# Patient Record
Sex: MALE | Employment: FULL TIME | URBAN - METROPOLITAN AREA
[De-identification: names, ages, dates, MRNs, and addresses within clinical notes are randomized per-mention and may not be internally consistent; named-entity substitution may affect disease eponyms.]

---

## 2018-04-19 ENCOUNTER — OFFICE VISIT (OUTPATIENT)
Dept: SLEEP MEDICINE | Facility: CLINIC | Age: 28
End: 2018-04-19
Payer: COMMERCIAL

## 2018-04-19 VITALS
DIASTOLIC BLOOD PRESSURE: 80 MMHG | HEIGHT: 74 IN | HEART RATE: 62 BPM | WEIGHT: 191.4 LBS | BODY MASS INDEX: 24.56 KG/M2 | OXYGEN SATURATION: 100 % | SYSTOLIC BLOOD PRESSURE: 147 MMHG | RESPIRATION RATE: 16 BRPM

## 2018-04-19 DIAGNOSIS — G25.81 RESTLESS LEGS SYNDROME (RLS): ICD-10-CM

## 2018-04-19 DIAGNOSIS — G47.50 PARASOMNIA: Primary | ICD-10-CM

## 2018-04-19 DIAGNOSIS — R03.0 ELEVATED BLOOD PRESSURE READING WITHOUT DIAGNOSIS OF HYPERTENSION: ICD-10-CM

## 2018-04-19 PROCEDURE — 99204 OFFICE O/P NEW MOD 45 MIN: CPT | Performed by: PHYSICIAN ASSISTANT

## 2018-04-19 NOTE — NURSING NOTE
"Chief Complaint   Patient presents with     Sleep Problem       Initial /79  Pulse 62  Resp 16  Ht 1.88 m (6' 2\")  Wt 86.8 kg (191 lb 6.4 oz)  SpO2 100%  BMI 24.57 kg/m2 Estimated body mass index is 24.57 kg/(m^2) as calculated from the following:    Height as of this encounter: 1.88 m (6' 2\").    Weight as of this encounter: 86.8 kg (191 lb 6.4 oz).  Medication Reconciliation: complete     ESS 5  Neck 39cm  Gris Cano    "

## 2018-04-19 NOTE — MR AVS SNAPSHOT
After Visit Summary   4/19/2018    Andrés Theodore    MRN: [id number]           Patient Information     Date Of Birth          1990        Visit Information        Provider Department      4/19/2018 10:30 AM Goltz, Bennett Ezra, PA-C [facility]        Today's Diagnoses     Parasomnia    -  1    Elevated blood pressure reading without diagnosis of hypertension          Care Instructions    Insomnia - Restless Leg Syndrome (RLS)  Based on the information that you provided today you are likely to have restless leg syndrome that may be interfering with your sleep.  Treatment of restless leg syndrome usually depends on how much it is bothering you.  If it is a major problem then you might want to do something about it.  Here are some treatment options that you might want to consider:       Behavior Changes:     - Reduce or eliminate caffeine and alcohol products     - Increase the amount of stretching that you do, particularly before bedtime     - Sometimes a leg message can be helpful     - Some people find that a warm bath or shower in the evening is helpful       Medications:     - Pramipexole (Mirapex)     - Ropinirole (Requip)     - Sinemet (Carbidopa / Levodopa)     - Gabapentin (Neurontin)                 Follow-ups after your visit        Who to contact     If you have questions or need follow up information about today's clinic visit or your schedule please contact [facility] directly at [phone].  Normal or non-critical lab and imaging results will be communicated to you by MyChart, letter or phone within 4 business days after the clinic has received the results. If you do not hear from us within 7 days, please contact the clinic through MyChart or phone. If you have a critical or abnormal lab result, we will notify you by phone as soon as possible.  Submit refill requests through [employer] or call your pharmacy and they will forward the refill request to  "us. Please allow 3 business days for your refill to be completed.          Additional Information About Your Visit        MyChart Information     [employer] gives you secure access to your electronic health record. If you see a primary care provider, you can also send messages to your care team and make appointments. If you have questions, please call your primary care clinic.  If you do not have a primary care provider, please call [phone] and they will assist you.        Care EveryWhere ID     This is your Care EveryWhere ID. This could be used by other organizations to access your [facility] medical records  [id number]        Your Vitals Were     Pulse Respirations Height Pulse Oximetry BMI (Body Mass Index)       62 16 1.88 m (6' 2\") 100% 24.57 kg/m2        Blood Pressure from Last 3 Encounters:   04/19/18 147/80   05/31/14 124/70   09/05/12 138/70    Weight from Last 3 Encounters:   04/19/18 86.8 kg (191 lb 6.4 oz)   05/31/14 80.7 kg (178 lb)   09/05/12 80.7 kg (178 lb)              Today, you had the following     No orders found for display       Primary Care Provider Fax #    Provider Not In System [phone]                Equal Access to Services     CELINE JONES : Hadii estella Caldwell, waaxda leonora, qaybta kaalmada adelewda, eliud smith. So [facility] [phone].    ATENCIÓN: Si habla español, tiene a rojas disposición servicios gratuitos de asistencia lingüística. Llame al [phone].    We comply with applicable federal civil rights laws and [place] laws. We do not discriminate on the basis of race, color, national origin, age, disability, sex, sexual orientation, or gender identity.            Thank you!     Thank you for choosing [facility]  for your care. Our goal is always to provide you with excellent care. Hearing back from our patients is one way we can continue to improve our services. Please take a few minutes to complete the written " survey that you may receive in the mail after your visit with us. Thank you!             Your Updated Medication List - Protect others around you: Learn how to safely use, store and throw away your medicines at www.disposemymeds.org.          This list is accurate as of 4/19/18 11:22 AM.  Always use your most recent med list.                   Brand Name Dispense Instructions for use Diagnosis    fish oil-omega-3 fatty acids 1000 MG capsule      Take 2 g by mouth daily. 1 capsule daily        MULTI VITAMIN MENS PO      Take  by mouth. 1 tablet daily        vitamin D 1000 units capsule      Take 1 capsule by mouth daily

## 2018-04-19 NOTE — PROGRESS NOTES
Sleep Consultation:    Date on this visit: 4/19/2018    Andrés Theodore is sent by No ref. provider found for a sleep consultation regarding movements in sleep.    Primary Physician: System, Provider Not In     Andrés Theodore presents with concerns of talking and moving in sleep since childhood. His medical history is non-contributory.    His wife has been concerned about his movements in sleep. He has sat up in bed and mumbled. He makes hand motions. That occurs about 2-3 times per week. He is not sure, but thinks it happens in the middle of the night. He is not aware of these events. The movements are not violent with one possible exception where he punched. These events are usually brief and he goes back to sleep. He does not recall dreams that correlate with the behaviors. He used to sleep walk, but has not since high school.    His mother has apnea and his paternal grandfather had a history of sleep walking.    Andrés goes to sleep at 9:00-9:30 PM during the week. He may look at his phone for up to 15 minutes. He wakes up at 6:00-6:30 AM usually before his alarm. He falls asleep in 15-30 minutes.  Andrés denies difficulty falling asleep.  He wakes up 0-1 times a night for 5 minutes before falling back to sleep.  Andrés wakes up to go to the bathroom.  On weekends, Andrés goes to sleep at 10:30 PM.  He wakes up at 6:30-7:30 AM without an alarm. He falls asleep in 15-30 minutes.  Patient gets an average of 8 hours of sleep per night. He has tried melatonin lately. He feels a little more relaxed from it, but is not sure if it has reduced the movements as he has been sleeping by himself since starting it.     Patient does use electronics in bed and does not watch TV in bed, worry in bed about anything and read in bed.     Andrés does not do shift work.  He works day shifts.  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 He lives with his wife, who is pregnant.    Andrés does not snore.  Patient does have a regular bed partner. There is no report of gasping, snorting or witnessed apneas. They have been sleeping separately for the last week because his wife is pregnant.  Patient sleeps on his back and stomach. He has occasional morning dry mouth and possible restless legs. Upon further questioning, it sounds more like a rhythmic movement/habit as he is winding down rather than a restless urge to jolt or twitch his legs. He sometimes feels an urge to flex his legs because of some tightness in his IT bands. He denies snort arousals, morning headaches and morning confusion. Andrés denies any bruxism, sleep paralysis, cataplexy and hypnogogic/hypnopompic hallucinations.    Andrés has mild occasional anxiety, denies difficulty breathing through his nose, claustrophobia, reflux at night, heartburn and depression.      Andrés has gained 5-10 pounds in the last half year.  Patient describes themself as a morning person.  He does not think his sleep schedule would be different if he did not have obligations in the day.  Patient's [place] Sleepiness score 5/24 inconsistent with daytime sleepiness.      Andrés naps 0-1 times per week for 30 minutes, feels refreshed after naps. He takes rare inadvertant naps, only if sleep deprived.  He denies dozing while driving. Patient was counseled on the importance of driving while alert, to pull over if drowsy, or nap before getting into the vehicle if sleepy.  He uses 1 cups/day of coffee. Last caffeine intake is usually before noon.    Allergies:    Allergies   Allergen Reactions     Nkda [No Known Drug Allergies]        Medications:    Current Outpatient Prescriptions   Medication Sig Dispense Refill     Cholecalciferol (VITAMIN D) 1000 UNITS capsule Take 1 capsule by mouth daily       fish oil-omega-3 fatty acids (FISH OIL) 1000 MG capsule Take 2 g by mouth daily. 1 capsule daily          Multiple Vitamin (MULTI VITAMIN MENS PO) Take  by mouth. 1 tablet daily             Problem List:  Patient Active Problem List    Diagnosis Date Noted     Upper back pain on left side 01/19/2016     Priority: Medium     Rhomboid muscle strain 01/19/2016     Priority: Medium     Common wart 09/05/2012     Priority: Medium     Male pattern baldness 07/03/2012     Priority: Medium     Anal fissure 07/03/2012     Priority: Medium     CARDIOVASCULAR SCREENING; LDL GOAL LESS THAN 160 07/03/2012     Priority: Medium     Hip pain 11/27/2009     Priority: Medium        Past Medical/Surgical History:  Past Medical History:   Diagnosis Date     NO ACTIVE PROBLEMS      Past Surgical History:   Procedure Laterality Date     NO HISTORY OF SURGERY       NONE OF THE ABOVE CORE MEASURE DIAGNOSES         Social History:  Social History     Social History     Marital status: Single     Spouse name: N/A     Number of children: N/A     Years of education: N/A     Occupational History     Not on file.     Social History Main Topics     Smoking status: Never Smoker     Smokeless tobacco: Never Used     Alcohol use Yes      Comment: few drinks on the weekend      Drug use: No     Sexual activity: Yes     Partners: Female, Male     Other Topics Concern     Not on file     Social History Narrative       Family History:  Family History   Problem Relation Age of Onset     CEREBROVASCULAR DISEASE Maternal Uncle      Sleep Apnea Mother        Review of Systems:  A complete review of systems reviewed by me is negative with the exeption of what has been mentioned in the history of present illness.  CONSTITUTIONAL: NEGATIVE for weight gain/loss, fever, chills, sweats or night sweats, drug allergies.  EYES: NEGATIVE for changes in vision, blind spots, double vision.  ENT: NEGATIVE for ear pain, sore throat, post-nasal drip, runny nose, bloody nose  ENT:  POSITIVE for  sinus pain  CARDIAC: NEGATIVE for fast heartbeats or fluttering in chest, chest pain or pressure, breathlessness when lying flat, swollen legs or swollen  "feet.  CARDIAC:  POSITIVE for  High blood pressure readings. Was normal at last visit to chiropractor  NEUROLOGIC: NEGATIVE headaches, weakness or numbness in the arms or legs.  DERMATOLOGIC: NEGATIVE for rashes  DERMATOLOGIC:  POSITIVE for  Moles-recenetly had one removed  PULMONARY: NEGATIVE SOB at rest, SOB with activity, dry cough, productive cough, coughing up blood, wheezing or whistling when breathing.    GASTROINTESTINAL: NEGATIVE for nausea or vomitting, loose or watery stools, fat or grease in stools, constipation, abdominal pain, bowel movements black in color or blood noted.  GENITOURINARY: NEGATIVE for pain during urination, blood in urine, urinating more frequently than usual, irregular menstrual periods.  MUSCULOSKELETAL: NEGATIVE for muscle pain, bone or joint pain, swollen joints.  ENDOCRINE: NEGATIVE for increased thirst or urination, diabetes.  LYMPHATIC: NEGATIVE for swollen lymph nodes, lumps or bumps in the breasts or nipple discharge.  MENTAL HEALTH: POSITIVE for anxiety    Physical Examination:  Vitals: /79  Pulse 62  Resp 16  Ht 1.88 m (6' 2\")  Wt 86.8 kg (191 lb 6.4 oz)  SpO2 100%  BMI 24.57 kg/m2  Neck Circumference: 39 cm.     [place] Total Score 4/19/2018   Total score - [place] 5       GENERAL APPEARANCE: healthy, alert, no distress and cooperative  EYES: Eyes grossly normal to inspection, PERRL, conjunctivae and sclerae normal and lids and lashes normal  HENT: nose and mouth without ulcers or lesions, oral mucous membranes moist and oropharynx clear  NECK: no adenopathy, no asymmetry, masses, or scars, thyroid normal to palpation and trachea midline and normal to palpation  RESP: lungs clear to auscultation - no rales, rhonchi or wheezes  CV: regular rates and rhythm, normal S1 S2, no S3 or S4, no murmur, click or rub and no irregular beats  LYMPHATICS: no cervical adenopathy  MS: extremities normal- no gross deformities noted  NEURO: Normal strength and tone, mentation " intact, speech normal and cranial nerves 2-12 intact  Mallampati Class: II.  Tonsillar Stage: 1  hidden by pillars.    Impression/Plan:    (G47.50) Parasomnia  (primary encounter diagnosis)  Comment: He describes what are most likely confusional arousals. He has a history of sleep walking, ending in high school. Now he sits up and mumbles, sometimes moving his arms. The behaviors are generally not violent. He does not have memory of the events. They usually occur in the middle of the night. I do not see any clear cause of increased arousals or increased sleep pressure that may contribute to these behaviors. Possible RLS may be the main exception. He does not seem sleep deprived. He does not snore and is young and thin, so ELLIOT is unlikely.   Plan: We discussed that confusional arousals can be triggered by things that cause arousals or increased sleep pressure. He was encouraged to avoid late caffeine, excessive alcohol, sleep deprivation, and an irregular sleep schedule. He was encouraged to safe-guard his room in the event that he has some movements. Move hard or sharp objects away from the bedside, place pillows between you and your bed partner, make sure there is nothing on the floor that you might trip over if you sleep walk. Lock the bedroom door to prevent leaving the room. He has just recently been trying melatonin. He will have his wife monitor for movements with melatonin. If he wants to try other treatment, we may consider treating possible RLS (see below). Lastly, we could consider clonazepam.    (R03.0) Elevated blood pressure reading without diagnosis of hypertension  Comment: He had a normal BP at his chiropractor recently. He has a cuff at home and was monitoring more last year. It was always normal at home, but a little high at doctor's offices.  Plan: He was encouraged to continue monitoring at home. Return to primary if readings are high.    (G25.81) Restless legs syndrome (RLS)  Comment: Possible  RLS. His symptoms sound more like a rhythmic movement to calm himself before bed than an uncomfortable urge to move.   Plan: We discussed possibly checking his ferritin or giving him a trial of gabapentin if he would like to more aggressively treat the movements. It may also reduce some of the behaviors in his sleep. He will contact me if he would like to pursue any of these interventions.      Literature provided regarding restless leg syndrome.      He will follow up with me in approximately two weeks after his sleep study has been competed to review the results and discuss plan of care.       Polysomnography reviewed.  Obstructive sleep apnea reviewed.  Complications of untreated sleep apnea were reviewed.  45 minutes was spent during this visit, over 50% in counseling and coordination of care.   Bennett Goltz, PA-C    CC: No ref. provider found

## 2018-04-19 NOTE — PATIENT INSTRUCTIONS
Insomnia - Restless Leg Syndrome (RLS)  Based on the information that you provided today you are likely to have restless leg syndrome that may be interfering with your sleep.  Treatment of restless leg syndrome usually depends on how much it is bothering you.  If it is a major problem then you might want to do something about it.  Here are some treatment options that you might want to consider:       Behavior Changes:     - Reduce or eliminate caffeine and alcohol products     - Increase the amount of stretching that you do, particularly before bedtime     - Sometimes a leg message can be helpful     - Some people find that a warm bath or shower in the evening is helpful       Medications:     - Pramipexole (Mirapex)     - Ropinirole (Requip)     - Sinemet (Carbidopa / Levodopa)     - Gabapentin (Neurontin)

## 2019-12-09 ENCOUNTER — HEALTH MAINTENANCE LETTER (OUTPATIENT)
Age: 29
End: 2019-12-09